# Patient Record
Sex: FEMALE | Race: OTHER | Employment: FULL TIME | URBAN - NONMETROPOLITAN AREA
[De-identification: names, ages, dates, MRNs, and addresses within clinical notes are randomized per-mention and may not be internally consistent; named-entity substitution may affect disease eponyms.]

---

## 2023-02-02 ENCOUNTER — NURSE ONLY (OUTPATIENT)
Dept: LAB | Age: 43
End: 2023-02-02

## 2023-02-13 LAB — CYTOLOGY THIN PREP PAP: NORMAL

## 2024-01-02 ENCOUNTER — OFFICE VISIT (OUTPATIENT)
Age: 44
End: 2024-01-02

## 2024-01-02 VITALS
SYSTOLIC BLOOD PRESSURE: 138 MMHG | DIASTOLIC BLOOD PRESSURE: 86 MMHG | HEART RATE: 102 BPM | HEIGHT: 62 IN | BODY MASS INDEX: 51.75 KG/M2 | WEIGHT: 281.2 LBS | RESPIRATION RATE: 18 BRPM

## 2024-01-02 DIAGNOSIS — K59.03 DRUG-INDUCED CONSTIPATION: ICD-10-CM

## 2024-01-02 DIAGNOSIS — Z79.4 TYPE 2 DIABETES MELLITUS WITH HYPERGLYCEMIA, WITH LONG-TERM CURRENT USE OF INSULIN (HCC): Primary | ICD-10-CM

## 2024-01-02 DIAGNOSIS — E11.65 TYPE 2 DIABETES MELLITUS WITH HYPERGLYCEMIA, WITH LONG-TERM CURRENT USE OF INSULIN (HCC): Primary | ICD-10-CM

## 2024-01-02 DIAGNOSIS — E78.2 MIXED HYPERLIPIDEMIA: ICD-10-CM

## 2024-01-02 PROCEDURE — 99214 OFFICE O/P EST MOD 30 MIN: CPT | Performed by: INTERNAL MEDICINE

## 2024-01-02 RX ORDER — HYDROCHLOROTHIAZIDE 25 MG/1
25 TABLET ORAL DAILY
COMMUNITY
Start: 2023-01-24

## 2024-01-02 RX ORDER — ALBUTEROL SULFATE 90 UG/1
2 AEROSOL, METERED RESPIRATORY (INHALATION) EVERY 6 HOURS PRN
COMMUNITY

## 2024-01-02 RX ORDER — INSULIN GLARGINE 100 [IU]/ML
25 INJECTION, SOLUTION SUBCUTANEOUS NIGHTLY
Qty: 5 ADJUSTABLE DOSE PRE-FILLED PEN SYRINGE | Refills: 5 | Status: SHIPPED | OUTPATIENT
Start: 2024-01-02

## 2024-01-02 NOTE — PROGRESS NOTES
Sanaz Silverman is a 43 y.o. , female who comes for f/u Type 2diabetes mellitus.  PCP: Tiffanie Farrar APRN - CNP    HPI   This patient was diagnosed with diabetes mellitus 10 years ago.    current therapy includes ozempic 2 mg weekly, lantus 20 units daily, glimeparide 1 mg bid and metformin 1000/1000.  The patient has been on 2 mg of Ozempic for 3 weeks now.  Nutritional plan: carb-counting target 30 grams/meal.  The patient has been experiencing significant GI symptoms since she got on the 2 mg of Ozempic including constipation, nausea and vomiting.  She is hardly eating.  Otherwise her physical activity has not changed.  Weight is stable.  The patient is checking blood sugars using Dexcom CGMS and her average blood glucose is 194 mg/dL with a glucose management indicator of 8.0%.  She is in the target range 36% of the time.  She denies polyuria, polydipsia and visual blurriness.  She reports no problems with the feet.  The patient has hyperlipidemia but she cannot tolerate statins.    Past Medical History:   Diagnosis Date    Diabetes mellitus (HCC)     Hypertension       History reviewed. No pertinent surgical history.    History reviewed. No pertinent family history.  Social History     Tobacco Use    Smoking status: Never    Smokeless tobacco: Never   Substance Use Topics    Alcohol use: Not on file      Current Outpatient Medications   Medication Sig Dispense Refill    hydroCHLOROthiazide (HYDRODIURIL) 25 MG tablet Take 1 tablet by mouth daily      SEMGLEE 100 UNIT/ML injection pen Inject 25 Units into the skin nightly 5 Adjustable Dose Pre-filled Pen Syringe 5    glimepiride (AMARYL) 1 MG tablet Take 1 tablet by mouth in the morning and at bedtime 60 tablet 5    Continuous Blood Gluc Transmit (DEXCOM G6 TRANSMITTER) MISC Check blood sugars 4x/day 1 each 1    Continuous Blood Gluc Sensor (DEXCOM G6 SENSOR) MISC Check blood sugars 4x/day 9 each 1    Continuous Blood Gluc  (DEXCOM G6 ) SKYLA

## 2024-01-05 NOTE — PROGRESS NOTES
CGMSINTERPRETATION    CGMS interpretation:  The patient is checking blood sugars 4 times a day using Dexcom continuous glucose monitoring system.  His download includes data from 12/20/2023 through 1/2/2024 and is sufficient for interpretation.  The CGM was active 99.5% of the time.  Average blood glucose is 194 mg/dL with a coefficient of variation of 20.4% and glucose management indicator of 8.0%  The patient was within the target range 36 percent of the time.  Hypoglycemia:  Blood glucose range between 54 mg/dL and 70mg/dL: 0%  Blood glucose is below 54 mg/dL: 0%  Hyperglycemia:  Blood glucoses between 181 and 250 mg/dL: 56%  Blood glucoses above 250 mg/dL: 8%  In summary, this patient is having hyperglycemia essentially all day  Recommendations: Please refer to the assessment and plan section

## 2024-01-16 LAB
ALBUMIN SERPL-MCNC: 4.6 G/DL
ALP BLD-CCNC: 113 U/L
ALT SERPL-CCNC: 22 U/L
ANION GAP SERPL CALCULATED.3IONS-SCNC: 12 MMOL/L
AST SERPL-CCNC: 14 U/L
BILIRUB SERPL-MCNC: 0.2 MG/DL (ref 0.1–1.4)
BUN BLDV-MCNC: 16 MG/DL
C-PEPTIDE: 5
CALCIUM SERPL-MCNC: 10.3 MG/DL
CHLORIDE BLD-SCNC: 97 MMOL/L
CO2: 25 MMOL/L
CREAT SERPL-MCNC: 1.04 MG/DL
EGFR: 68
ESTIMATED AVERAGE GLUCOSE: 192
GLUCOSE BLD-MCNC: 187 MG/DL
HBA1C MFR BLD: 8.3 %
POTASSIUM SERPL-SCNC: 4.3 MMOL/L
SODIUM BLD-SCNC: 134 MMOL/L
TOTAL PROTEIN: 7.3

## 2025-04-09 ENCOUNTER — TRANSCRIBE ORDERS (OUTPATIENT)
Dept: ADMINISTRATIVE | Age: 45
End: 2025-04-09

## 2025-04-09 DIAGNOSIS — Z12.31 ENCOUNTER FOR SCREENING MAMMOGRAM FOR MALIGNANT NEOPLASM OF BREAST: Primary | ICD-10-CM

## 2025-05-11 DIAGNOSIS — E11.65 TYPE 2 DIABETES MELLITUS WITH HYPERGLYCEMIA, WITH LONG-TERM CURRENT USE OF INSULIN (HCC): ICD-10-CM

## 2025-05-11 DIAGNOSIS — Z79.4 TYPE 2 DIABETES MELLITUS WITH HYPERGLYCEMIA, WITH LONG-TERM CURRENT USE OF INSULIN (HCC): ICD-10-CM

## 2025-05-13 RX ORDER — INSULIN GLARGINE-YFGN 100 [IU]/ML
INJECTION, SOLUTION SUBCUTANEOUS
Qty: 15 ML | Refills: 5 | Status: SHIPPED | OUTPATIENT
Start: 2025-05-13